# Patient Record
Sex: FEMALE | Race: BLACK OR AFRICAN AMERICAN | Employment: OTHER | URBAN - METROPOLITAN AREA
[De-identification: names, ages, dates, MRNs, and addresses within clinical notes are randomized per-mention and may not be internally consistent; named-entity substitution may affect disease eponyms.]

---

## 2020-02-20 ENCOUNTER — HOSPITAL ENCOUNTER (OUTPATIENT)
Dept: DIABETES SERVICES | Age: 75
Setting detail: THERAPIES SERIES
Discharge: HOME | End: 2020-02-20
Payer: MEDICARE

## 2020-02-20 PROCEDURE — G0108 DIAB MANAGE TRN  PER INDIV: HCPCS

## 2020-02-20 NOTE — LETTER
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  [x] Seems able to apply class concepts   to daily lifestyle most of the time  [x] Seems motivated to do well [x] Participated in [employer] nutrition              session  [x] Able to identify proper food choices      and diet changes  [x] Verbalizes an understanding of meal       Plan after RD customized food lists  [x] Expresses an intent to comply with            meal plan  [x] Worked out meal timing adjustment            according to work/schedule/lifestyle     PHQ-9 Depression Screen Score: 6  0-4: Minimal Depression                5-9: Mild Depression    10-14: Moderate Depression  15-19: Moderately Severe Depression  20-27: Severe Depression     COMMENTS:  Pt attended the nutrition portion of the sessions with the dietitian on an individual basis w/ her daughter on 2/20/2020. PATIENT SELECTED GOAL:Eating more vegetables and whole grains    DIABETES SELF-MANAGEMENT SUPPORT PLAN/REFERRALS (patient identified):  [] Keep my scheduled visits with my doctor   [] Make and keep appointments with specialists (foot, eye, dentist) as recommended  [] Consult my pharmacist with all new medications and/or any medication questions  [] Get tested for sleep apnea  [] Seek help for:   [] Make an appointment with:   [] Attend smoking cessation classes or call help-line (1-335-QUIT-NOW; 755.788.1157)  [] Attend a diabetes support group  [] Use diabetes magazines, books, or the American Diabetes Association website (www.diabetes. org) for more information    [] Start or increase exercising at home or join a program:  [] Other: There will be a follow-up in 3 months to evaluate A1C, carbohydrate recall, attainment of their chosen goal, and self identified support plan.     Thank you for referring this patient to our program.  Please do not hesitate to call if you have any questions at [phone] [facility] or [address] [facility]) or [phone] ([address] [place] [address]).         Sincerely,    Diabetes Educators:     [x]  Opal Chao RN CDE      []  Lani Luis RN BSN CDE     []  Faith Favre, RDN LD CDE          [x]   Junior Gilman MS RDN LD   []  Marck Seals RDN LD  []  EARNEST Lucas CDE

## 2022-01-13 LAB
ALBUMIN SERPL-MCNC: 3.7 G/DL (ref 3.5–5.2)
ALP BLD-CCNC: 82 U/L (ref 35–104)
ALT SERPL-CCNC: 20 U/L (ref 0–32)
ANION GAP SERPL CALCULATED.3IONS-SCNC: 11 MMOL/L (ref 7–16)
AST SERPL-CCNC: 24 U/L (ref 0–31)
BASOPHILS ABSOLUTE: 0.05 E9/L (ref 0–0.2)
BASOPHILS RELATIVE PERCENT: 0.8 % (ref 0–2)
BILIRUB SERPL-MCNC: 0.3 MG/DL (ref 0–1.2)
BUN BLDV-MCNC: 21 MG/DL (ref 6–23)
CALCIUM SERPL-MCNC: 9.6 MG/DL (ref 8.6–10.2)
CHLORIDE BLD-SCNC: 99 MMOL/L (ref 98–107)
CO2: 34 MMOL/L (ref 22–29)
CREAT SERPL-MCNC: 1.2 MG/DL (ref 0.5–1)
EOSINOPHILS ABSOLUTE: 0.2 E9/L (ref 0.05–0.5)
EOSINOPHILS RELATIVE PERCENT: 3.1 % (ref 0–6)
GFR AFRICAN AMERICAN: 52
GFR NON-AFRICAN AMERICAN: 52 ML/MIN/1.73
GLUCOSE BLD-MCNC: 164 MG/DL (ref 74–99)
HCT VFR BLD CALC: 31.5 % (ref 34–48)
HEMOGLOBIN: 9.5 G/DL (ref 11.5–15.5)
IMMATURE GRANULOCYTES #: 0.03 E9/L
IMMATURE GRANULOCYTES %: 0.5 % (ref 0–5)
LYMPHOCYTES ABSOLUTE: 1.68 E9/L (ref 1.5–4)
LYMPHOCYTES RELATIVE PERCENT: 25.9 % (ref 20–42)
MAGNESIUM: 2.2 MG/DL (ref 1.6–2.6)
MCH RBC QN AUTO: 29.1 PG (ref 26–35)
MCHC RBC AUTO-ENTMCNC: 30.2 % (ref 32–34.5)
MCV RBC AUTO: 96.3 FL (ref 80–99.9)
MONOCYTES ABSOLUTE: 0.43 E9/L (ref 0.1–0.95)
MONOCYTES RELATIVE PERCENT: 6.6 % (ref 2–12)
NEUTROPHILS ABSOLUTE: 4.1 E9/L (ref 1.8–7.3)
NEUTROPHILS RELATIVE PERCENT: 63.1 % (ref 43–80)
PDW BLD-RTO: 16.5 FL (ref 11.5–15)
PLATELET # BLD: 204 E9/L (ref 130–450)
PMV BLD AUTO: 9.8 FL (ref 7–12)
POTASSIUM SERPL-SCNC: 4.5 MMOL/L (ref 3.5–5)
PRO-BNP: 579 PG/ML (ref 0–450)
RBC # BLD: 3.27 E12/L (ref 3.5–5.5)
SODIUM BLD-SCNC: 144 MMOL/L (ref 132–146)
TOTAL PROTEIN: 6.1 G/DL (ref 6.4–8.3)
WBC # BLD: 6.5 E9/L (ref 4.5–11.5)

## 2022-09-11 ENCOUNTER — HOSPITAL ENCOUNTER (EMERGENCY)
Age: 77
Discharge: NURSING, REHAB OR LONG-TERM CARE | End: 2022-09-12
Attending: EMERGENCY MEDICINE
Payer: MEDICARE

## 2022-09-11 VITALS
TEMPERATURE: 98 F | HEART RATE: 74 BPM | OXYGEN SATURATION: 98 % | RESPIRATION RATE: 14 BRPM | DIASTOLIC BLOOD PRESSURE: 82 MMHG | HEIGHT: 63 IN | BODY MASS INDEX: 51.91 KG/M2 | SYSTOLIC BLOOD PRESSURE: 123 MMHG | WEIGHT: 293 LBS

## 2022-09-11 DIAGNOSIS — F41.1 ANXIETY STATE: Primary | ICD-10-CM

## 2022-09-11 PROCEDURE — 6370000000 HC RX 637 (ALT 250 FOR IP): Performed by: STUDENT IN AN ORGANIZED HEALTH CARE EDUCATION/TRAINING PROGRAM

## 2022-09-11 PROCEDURE — 99284 EMERGENCY DEPT VISIT MOD MDM: CPT

## 2022-09-11 RX ORDER — HYDROXYZINE PAMOATE 25 MG/1
25 CAPSULE ORAL ONCE
Status: COMPLETED | OUTPATIENT
Start: 2022-09-11 | End: 2022-09-11

## 2022-09-11 RX ADMIN — HYDROXYZINE PAMOATE 25 MG: 25 CAPSULE ORAL at 17:10

## 2022-09-11 ASSESSMENT — PAIN - FUNCTIONAL ASSESSMENT
PAIN_FUNCTIONAL_ASSESSMENT: NONE - DENIES PAIN
PAIN_FUNCTIONAL_ASSESSMENT: NONE - DENIES PAIN

## 2022-09-11 ASSESSMENT — LIFESTYLE VARIABLES
HOW OFTEN DO YOU HAVE A DRINK CONTAINING ALCOHOL: NEVER
HOW OFTEN DO YOU HAVE A DRINK CONTAINING ALCOHOL: NEVER

## 2022-09-11 NOTE — ED NOTES
This Rn spoke with nurse at [facility], stated that patient was treated for her panic attack, and all vitals are within normal limits. Per Dr Katelin Hernandez pt does not meet admission requirements to hospital.  Family wants placement to a new facility. Pt stable and is ready for transport back to NH. Dr Katelin Hernandez and this RN spoke to pt and pt daughter and explained pt is being discharged back. Nurse from NH states DON is aware of situation.        Romayne Lowes, RN  09/11/22 6851

## 2022-09-11 NOTE — ED PROVIDER NOTES
Department of Emergency Medicine   ED Provider Note  Admit Date/RoomTime: 9/11/2022  4:33 PM  ED Room: 02/02          History of Present Illness:  9/11/22, Time: 5:00 PM EDT         Cayden Woods is a 76 y.o. female with history of atrial fibrillation, CHF, presenting to the ED for anxiety/panic attack, beginning just prior to arrival.  The complaint has been intermittent, moderate in severity, and worsened by emotional upset. Patient was recently admitted to [facility] for reported UTI/CHF, was just discharged to nursing facility around 1 hour prior to arrival.  Patient arrived at the nursing facility, became upset at the appearance of the nursing facility and the fact that they did not have a suction catheter for urinating. Patient states that she felt like her heart was racing, felt very anxious and that she was having a panic attack, no chest pain or shortness of breath. She denies any abdominal pain or nausea or vomiting, denies any lightheadedness or syncope. Previously she was living at home, ambulating with a walker but was discharged to nursing facility from [facility].    Review of Systems:     Pertinent positives and negatives are stated within HPI. 10 point ROS otherwise negative.  --------------------------------------------- PAST HISTORY ---------------------------------------------  Past Medical History:  has no past medical history on file. Past Surgical History:  has no past surgical history on file. Social History:  reports that she has never smoked. She has never used smokeless tobacco. She reports that she does not drink alcohol and does not use drugs. Family History: family history is not on file. The patients home medications have been reviewed. Allergies: Patient has no known allergies.         ---------------------------------------------------PHYSICAL EXAM--------------------------------------    Constitutional/General: AAO to person/place/time/purpose, NAD, no labored breathing  Head: Normocephalic and atraumatic  Eyes: EOMI, conjunctiva normal, sclera non icteric  Mouth: Moist mucous membranes, uvula midline  Neck: Supple, no stridor, no meningeal signs  Respiratory: Scattered crackles bilateral lung bases, no respiratory distress, no accessory muscle use, no rales or rhonchi. patient 100% SPO2 on her normal 4 L of O2 via nasal cannula. Cardiovascular:  Regular rate. Regular rhythm. No murmurs, no gallops, or rubs. 2+ distal pulses. Equal extremity pulses. Chest: No chest wall tenderness or deformity  GI: abdomen obese, Soft, Non tender, Non distended. No rebound, guarding, or rigidity. No pulsatile masses. Musculoskeletal: Moves all extremities x 4. Warm and well perfused, no clubbing, cyanosis. No pretibial edema. Capillary refill <3 seconds  Integument: skin warm and dry. Neurologic: GCS 15, no focal deficits, symmetric strength 4/5 in the major muscle groups of upper extremities bilaterally. Generalized weakness lower extremities. 2 out of 5 strength major muscles of bilateral lower extremities. Psychiatric: Anxious, intermittently tearful      -------------------------------------------------- RESULTS -------------------------------------------------  I have personally reviewed all laboratory and imaging results for this patient. Results are listed below. LABS:  No results found for this visit on 09/11/22. RADIOLOGY:  Interpreted by Radiologist unless otherwise specified  No orders to display     ------------------------- NURSING NOTES AND VITALS REVIEWED ---------------------------   The nursing notes within the ED encounter and vital signs as below have been reviewed by myself. /86   Pulse 89   Resp 18   Ht 5' 3\" (1.6 m)   Wt 295 lb (133.8 kg)   SpO2 99%   BMI 52.26 kg/m²   Oxygen Saturation Interpretation: Normal    The patients available past medical records and past encounters were reviewed. ------------------------------ ED COURSE/MEDICAL DECISION MAKING----------------------  Medications   hydrOXYzine pamoate (VISTARIL) capsule 25 mg (25 mg Oral Given 9/11/22 1710)            Medical Decision Making: This is a 26-year-old female presenting to the emergency department for anxiety. Patient was recently admitted to [facility], from discussion with daughter it was mostly for placement purposes due to deconditioning, but was also treated for a UTI, some mild CHF there. She was discharged to rehab facility today, was there approximately 1 hour and presented to the emergency department here due to anxiety. Patient states she was not happy with the appearance of the facility, and wishes to go to a different facility. Lengthy discussions were had with daughter. Patient normotensive, not tachycardic, 99% - 100% SPO2 on her normal 4 L of oxygen here. She is well-appearing with no peripheral edema. Here she is asymptomatic, states that due to the lighting in appearance here she is no longer anxious. Given lack of emergent medical problem, patient's well appearance and recent hospitalization/treatment and recent extensive work-up at [facility], patient will be discharged to rehab for further strengthening/treatment. Patient given strict return precautions. See ED COURSE for additional MDM. Labs & imaging reviewed and interpreted, see RESULTS above. Re-Evaluations:             ED Course as of 09/11/22 2107   Peyton Roblero Sep 11, 2022   1740   ATTENDING PROVIDER ATTESTATION:     I have personally performed and/or participated in the history, exam, medical decision making, and procedures and agree with all pertinent clinical information unless otherwise noted. I have also reviewed and agree with the past medical, family and social history unless otherwise noted.     I have discussed this patient in detail with the resident and provided the instruction and education regarding the evidence-based evaluation and treatment of panic attack. Any EKG that may have been performed has been personally reviewed by me and I agree with the documentation as noted by the resident. History: patient was discharged today from [facility] and went to [facility]. When she arrived there she was dissatisfied with her care. She was upset that the lighting was poor, there was no table with water and they could not provide her with a [address]. She states it made her upset, tearful and her heart was racing. She admits she is feeling better now. My findings: Fatou Fuentes is a 76 y.o. female whom is in no distress. Physical exam reveals elevated BMI. Heart irregularly irregular with normal rate. Lungs CTA. Abdomen is soft and nontender. She has no focal neurologic deficits. My plan: Symptomatic and supportive care. I spoke with patient and her daughter Felix Arias at length. I can not make arrangements on a Sunday night for a different facility. She does not meet criteria for admission. We will call [facility] to explain the patient's concerns and the daughter can work with the staff tomorrow. Electronically signed by Chintan Gagnon DO on 9/11/22 at 5:40 PM EDT       [JS]      ED Course User Index  [JS] Chintan Gagnon DO         This patient's ED course included: a personal history and physicial examination, re-evaluation prior to disposition, multiple bedside re-evaluations, cardiac monitoring, and continuous pulse oximetry    This patient has remained hemodynamically stable during their ED course. Consultations:  See ED Course    Counseling: The emergency provider has spoken with the patient  and daughter and discussed todays results, in addition to providing specific details for the plan of care and counseling regarding the diagnosis and prognosis. Questions are answered at this time.        --------------------------------- IMPRESSION AND DISPOSITION ---------------------------------    IMPRESSION  1. Anxiety state        DISPOSITION  Disposition: Discharge to nursing home  Patient condition is stable    NOTE: This report was transcribed using voice recognition software. Every effort was made to ensure accuracy; however, inadvertent computerized transcription errors may be present. Also please note that patient was seen and examined by attending physician. Plan of care and disposition discussed with attending physician and they were immediately available or present for all procedures performed.        -- Nahomy Watson D.O. PGY-3     Resident Physician     Emergency Medicine      9/11/2022 9:07 PM         [address] DO  Resident  09/11/22 5510

## 2022-09-12 NOTE — ED NOTES
Physician's at bedside for transfer back to [facility]. Pt told ED staff she was filing a lawsuit against the hospital and the ED staff, and stated \"you all didn't have to be prejudiced. \" Pt was asked what occurred to make her think that. Pt reported she was forced to go back to the rehab center to die. Pt left with PAS in stable condition.      Romaine Woods RN  09/12/22 0003

## 2022-09-28 LAB
ALBUMIN SERPL-MCNC: 3.4 G/DL (ref 3.5–5.2)
ALP BLD-CCNC: 96 U/L (ref 35–104)
ALT SERPL-CCNC: 34 U/L (ref 0–32)
ANION GAP SERPL CALCULATED.3IONS-SCNC: 10 MMOL/L (ref 7–16)
AST SERPL-CCNC: 57 U/L (ref 0–31)
BASOPHILS ABSOLUTE: 0.06 E9/L (ref 0–0.2)
BASOPHILS RELATIVE PERCENT: 0.7 % (ref 0–2)
BILIRUB SERPL-MCNC: 0.3 MG/DL (ref 0–1.2)
BUN BLDV-MCNC: 23 MG/DL (ref 6–23)
CALCIUM SERPL-MCNC: 9.2 MG/DL (ref 8.6–10.2)
CHLORIDE BLD-SCNC: 97 MMOL/L (ref 98–107)
CO2: 35 MMOL/L (ref 22–29)
CREAT SERPL-MCNC: 1.4 MG/DL (ref 0.5–1)
EOSINOPHILS ABSOLUTE: 0.19 E9/L (ref 0.05–0.5)
EOSINOPHILS RELATIVE PERCENT: 2.2 % (ref 0–6)
GFR AFRICAN AMERICAN: 44
GFR NON-AFRICAN AMERICAN: 44 ML/MIN/1.73
GLUCOSE BLD-MCNC: 130 MG/DL (ref 74–99)
HCT VFR BLD CALC: 33.1 % (ref 34–48)
HEMOGLOBIN: 10.2 G/DL (ref 11.5–15.5)
IMMATURE GRANULOCYTES #: 0.09 E9/L
IMMATURE GRANULOCYTES %: 1 % (ref 0–5)
LYMPHOCYTES ABSOLUTE: 1.81 E9/L (ref 1.5–4)
LYMPHOCYTES RELATIVE PERCENT: 20.7 % (ref 20–42)
MCH RBC QN AUTO: 29.7 PG (ref 26–35)
MCHC RBC AUTO-ENTMCNC: 30.8 % (ref 32–34.5)
MCV RBC AUTO: 96.2 FL (ref 80–99.9)
MONOCYTES ABSOLUTE: 0.62 E9/L (ref 0.1–0.95)
MONOCYTES RELATIVE PERCENT: 7.1 % (ref 2–12)
NEUTROPHILS ABSOLUTE: 5.96 E9/L (ref 1.8–7.3)
NEUTROPHILS RELATIVE PERCENT: 68.3 % (ref 43–80)
PDW BLD-RTO: 15.7 FL (ref 11.5–15)
PLATELET # BLD: 241 E9/L (ref 130–450)
PMV BLD AUTO: 10.2 FL (ref 7–12)
POTASSIUM SERPL-SCNC: 4.3 MMOL/L (ref 3.5–5)
RBC # BLD: 3.44 E12/L (ref 3.5–5.5)
SODIUM BLD-SCNC: 142 MMOL/L (ref 132–146)
T4 TOTAL: 15.2 MCG/DL (ref 4.5–11.7)
TOTAL PROTEIN: 6.7 G/DL (ref 6.4–8.3)
TSH SERPL DL<=0.05 MIU/L-ACNC: 3.21 UIU/ML (ref 0.27–4.2)
WBC # BLD: 8.7 E9/L (ref 4.5–11.5)

## 2022-09-29 LAB
BACTERIA: ABNORMAL /HPF
BILIRUBIN URINE: NEGATIVE
BLOOD, URINE: ABNORMAL
CLARITY: ABNORMAL
COLOR: YELLOW
CRYSTALS, UA: ABNORMAL /HPF
EPITHELIAL CELLS, UA: ABNORMAL /HPF
GLUCOSE URINE: NEGATIVE MG/DL
KETONES, URINE: NEGATIVE MG/DL
LEUKOCYTE ESTERASE, URINE: ABNORMAL
NITRITE, URINE: NEGATIVE
PH UA: 8.5 (ref 5–9)
PROTEIN UA: 30 MG/DL
RBC UA: ABNORMAL /HPF (ref 0–2)
SPECIFIC GRAVITY UA: 1.01 (ref 1–1.03)
UROBILINOGEN, URINE: 0.2 E.U./DL
WBC UA: ABNORMAL /HPF (ref 0–5)
YEAST: PRESENT /HPF

## 2022-10-01 LAB — URINE CULTURE, ROUTINE: NORMAL
